# Patient Record
Sex: FEMALE | Race: WHITE | Employment: OTHER | URBAN - METROPOLITAN AREA
[De-identification: names, ages, dates, MRNs, and addresses within clinical notes are randomized per-mention and may not be internally consistent; named-entity substitution may affect disease eponyms.]

---

## 2017-05-01 ENCOUNTER — LAB ENCOUNTER (OUTPATIENT)
Dept: LAB | Facility: HOSPITAL | Age: 82
End: 2017-05-01
Attending: OBSTETRICS & GYNECOLOGY
Payer: MEDICARE

## 2017-05-01 DIAGNOSIS — Z51.81 ENCOUNTER FOR THERAPEUTIC DRUG MONITORING: Primary | ICD-10-CM

## 2017-05-01 PROCEDURE — 85610 PROTHROMBIN TIME: CPT

## 2017-05-01 PROCEDURE — 36415 COLL VENOUS BLD VENIPUNCTURE: CPT

## 2017-05-02 ENCOUNTER — LAB ENCOUNTER (OUTPATIENT)
Dept: LAB | Facility: HOSPITAL | Age: 82
End: 2017-05-02
Attending: INTERNAL MEDICINE
Payer: MEDICARE

## 2017-05-02 DIAGNOSIS — Z51.81 ENCOUNTER FOR THERAPEUTIC DRUG MONITORING: Primary | ICD-10-CM

## 2017-05-02 DIAGNOSIS — Z79.01 LONG TERM (CURRENT) USE OF ANTICOAGULANTS: ICD-10-CM

## 2017-05-02 PROCEDURE — 36415 COLL VENOUS BLD VENIPUNCTURE: CPT

## 2017-05-02 PROCEDURE — 85610 PROTHROMBIN TIME: CPT

## 2017-07-07 ENCOUNTER — HOSPITAL ENCOUNTER (OUTPATIENT)
Dept: LAB | Facility: HOSPITAL | Age: 82
Discharge: HOME | End: 2017-07-07
Attending: INTERNAL MEDICINE
Payer: MEDICARE

## 2017-07-07 ENCOUNTER — HOSPITAL ENCOUNTER (OUTPATIENT)
Dept: LAB | Facility: HOSPITAL | Age: 82
Discharge: HOME | End: 2017-07-07
Attending: FAMILY MEDICINE
Payer: MEDICARE

## 2017-07-07 DIAGNOSIS — Z87.59 HISTORY OF MATERNAL PULMONARY EMBOLUS: ICD-10-CM

## 2017-07-07 DIAGNOSIS — Z86.711 HISTORY OF MATERNAL PULMONARY EMBOLUS: ICD-10-CM

## 2017-07-07 LAB
ALBUMIN SERPL-MCNC: 3.5 G/DL (ref 3.5–4.8)
ALP LIVER SERPL-CCNC: 91 U/L (ref 55–142)
ALT SERPL-CCNC: 15 U/L (ref 14–54)
AST SERPL-CCNC: 19 U/L (ref 15–41)
BILIRUB DIRECT SERPL-MCNC: 0.1 MG/DL (ref 0.1–0.5)
BILIRUB SERPL-MCNC: 0.5 MG/DL (ref 0.1–2)
M PROTEIN MFR SERPL ELPH: 7.7 G/DL (ref 6.1–8.3)
POC INR: 3.8 (ref 0.8–1.3)

## 2017-07-07 PROCEDURE — 85610 PROTHROMBIN TIME: CPT

## 2017-07-07 PROCEDURE — 80076 HEPATIC FUNCTION PANEL: CPT

## 2017-07-07 PROCEDURE — 36415 COLL VENOUS BLD VENIPUNCTURE: CPT

## 2017-07-17 ENCOUNTER — HOSPITAL ENCOUNTER (OUTPATIENT)
Dept: LAB | Facility: HOSPITAL | Age: 82
Discharge: HOME | End: 2017-07-17
Attending: FAMILY MEDICINE
Payer: MEDICARE

## 2017-07-17 DIAGNOSIS — Z86.711 HISTORY OF MATERNAL PULMONARY EMBOLUS: ICD-10-CM

## 2017-07-17 DIAGNOSIS — Z87.59 HISTORY OF MATERNAL PULMONARY EMBOLUS: ICD-10-CM

## 2017-07-17 LAB — POC INR: 2.9 (ref 0.8–1.3)

## 2017-07-17 PROCEDURE — 85610 PROTHROMBIN TIME: CPT

## 2017-07-24 ENCOUNTER — LAB SERVICES (OUTPATIENT)
Dept: OTHER | Age: 82
End: 2017-07-24

## 2017-07-24 LAB
INTERNATIONAL NORMALIZED RATIO: 2.1 (ref 1.8–3.5)
PROTHROMBIN TIME, THERAPEUTIC: 22.7 S (ref 9.8–11.8)

## 2017-08-07 ENCOUNTER — HOSPITAL ENCOUNTER (OUTPATIENT)
Dept: LAB | Facility: HOSPITAL | Age: 82
Discharge: HOME | End: 2017-08-07
Attending: FAMILY MEDICINE
Payer: MEDICARE

## 2017-08-07 DIAGNOSIS — Z86.711 HISTORY OF MATERNAL PULMONARY EMBOLUS: ICD-10-CM

## 2017-08-07 DIAGNOSIS — Z87.59 HISTORY OF MATERNAL PULMONARY EMBOLUS: ICD-10-CM

## 2017-08-07 LAB — POC INR: 2.5 (ref 0.8–1.3)

## 2017-08-07 PROCEDURE — 85610 PROTHROMBIN TIME: CPT

## 2017-08-21 ENCOUNTER — HOSPITAL ENCOUNTER (OUTPATIENT)
Dept: LAB | Facility: HOSPITAL | Age: 82
Discharge: HOME | End: 2017-08-21
Attending: FAMILY MEDICINE
Payer: MEDICARE

## 2017-08-21 DIAGNOSIS — Z86.711 HISTORY OF MATERNAL PULMONARY EMBOLUS: ICD-10-CM

## 2017-08-21 DIAGNOSIS — Z87.59 HISTORY OF MATERNAL PULMONARY EMBOLUS: ICD-10-CM

## 2017-08-21 LAB — POC INR: 1.9 (ref 0.8–1.3)

## 2017-08-21 PROCEDURE — 85610 PROTHROMBIN TIME: CPT

## 2017-09-06 ENCOUNTER — HOSPITAL ENCOUNTER (OUTPATIENT)
Dept: LAB | Facility: HOSPITAL | Age: 82
Discharge: HOME | End: 2017-09-06
Attending: FAMILY MEDICINE
Payer: MEDICARE

## 2017-09-06 DIAGNOSIS — Z87.59 HISTORY OF MATERNAL PULMONARY EMBOLUS: ICD-10-CM

## 2017-09-06 DIAGNOSIS — Z86.711 HISTORY OF MATERNAL PULMONARY EMBOLUS: ICD-10-CM

## 2017-09-06 LAB — POC INR: 2.6 (ref 0.8–1.3)

## 2017-09-06 PROCEDURE — 85610 PROTHROMBIN TIME: CPT

## 2017-09-19 ENCOUNTER — HOSPITAL ENCOUNTER (OUTPATIENT)
Dept: LAB | Facility: HOSPITAL | Age: 82
Discharge: HOME | End: 2017-09-19
Attending: FAMILY MEDICINE
Payer: MEDICARE

## 2017-09-19 DIAGNOSIS — Z87.59 HISTORY OF MATERNAL PULMONARY EMBOLUS: ICD-10-CM

## 2017-09-19 DIAGNOSIS — Z86.711 HISTORY OF MATERNAL PULMONARY EMBOLUS: ICD-10-CM

## 2017-09-19 LAB — POC INR: 2 (ref 0.8–1.3)

## 2017-09-19 PROCEDURE — 85610 PROTHROMBIN TIME: CPT

## 2017-10-03 ENCOUNTER — HOSPITAL ENCOUNTER (OUTPATIENT)
Dept: LAB | Facility: HOSPITAL | Age: 82
Discharge: HOME | End: 2017-10-03
Attending: FAMILY MEDICINE
Payer: MEDICARE

## 2017-10-03 DIAGNOSIS — Z87.59 HISTORY OF MATERNAL PULMONARY EMBOLUS: ICD-10-CM

## 2017-10-03 DIAGNOSIS — Z86.711 HISTORY OF MATERNAL PULMONARY EMBOLUS: ICD-10-CM

## 2017-10-03 PROCEDURE — 85610 PROTHROMBIN TIME: CPT

## 2017-10-30 ENCOUNTER — LAB SERVICES (OUTPATIENT)
Dept: OTHER | Age: 82
End: 2017-10-30

## 2017-10-30 LAB
INTERNATIONAL NORMALIZED RATIO: 2.2 (ref 1.8–3.5)
PROTHROMBIN TIME, THERAPEUTIC: 24.2 S (ref 9.8–11.8)

## 2017-11-30 ENCOUNTER — HOSPITAL ENCOUNTER (OUTPATIENT)
Dept: LAB | Facility: HOSPITAL | Age: 82
Discharge: HOME | End: 2017-11-30
Attending: FAMILY MEDICINE
Payer: MEDICARE

## 2017-11-30 DIAGNOSIS — Z87.59 HISTORY OF MATERNAL PULMONARY EMBOLUS: ICD-10-CM

## 2017-11-30 DIAGNOSIS — Z86.711 HISTORY OF MATERNAL PULMONARY EMBOLUS: ICD-10-CM

## 2017-11-30 PROCEDURE — 85610 PROTHROMBIN TIME: CPT

## 2017-12-28 ENCOUNTER — HOSPITAL ENCOUNTER (OUTPATIENT)
Dept: LAB | Facility: HOSPITAL | Age: 82
Discharge: HOME | End: 2017-12-28
Attending: FAMILY MEDICINE
Payer: MEDICARE

## 2017-12-28 DIAGNOSIS — Z87.59 HISTORY OF MATERNAL PULMONARY EMBOLUS: ICD-10-CM

## 2017-12-28 DIAGNOSIS — Z86.711 HISTORY OF MATERNAL PULMONARY EMBOLUS: ICD-10-CM

## 2017-12-28 PROCEDURE — 85610 PROTHROMBIN TIME: CPT

## 2018-01-18 ENCOUNTER — HOSPITAL ENCOUNTER (OUTPATIENT)
Dept: LAB | Facility: HOSPITAL | Age: 83
Discharge: HOME | End: 2018-01-18
Attending: FAMILY MEDICINE
Payer: MEDICARE

## 2018-01-18 DIAGNOSIS — Z87.59 HISTORY OF MATERNAL PULMONARY EMBOLUS: ICD-10-CM

## 2018-01-18 DIAGNOSIS — Z86.711 HISTORY OF MATERNAL PULMONARY EMBOLUS: ICD-10-CM

## 2018-01-18 LAB
ALBUMIN SERPL-MCNC: 3.7 G/DL (ref 3.5–4.8)
ALP LIVER SERPL-CCNC: 101 U/L (ref 55–142)
ALT SERPL-CCNC: 18 U/L (ref 14–54)
AST SERPL-CCNC: 21 U/L (ref 15–41)
BASOPHILS # BLD AUTO: 0.05 X10(3) UL (ref 0–0.1)
BASOPHILS NFR BLD AUTO: 0.7 %
BILIRUB SERPL-MCNC: 0.9 MG/DL (ref 0.1–2)
BILIRUB UR QL STRIP.AUTO: NEGATIVE
BUN BLD-MCNC: 35 MG/DL (ref 8–20)
CALCIUM BLD-MCNC: 9.3 MG/DL (ref 8.3–10.3)
CHLORIDE: 107 MMOL/L (ref 101–111)
CHOLEST SMN-MCNC: 168 MG/DL (ref ?–200)
CLARITY UR REFRACT.AUTO: CLEAR
CO2: 26 MMOL/L (ref 22–32)
COLOR UR AUTO: YELLOW
CREAT BLD-MCNC: 1.18 MG/DL (ref 0.55–1.02)
EOSINOPHIL # BLD AUTO: 0.22 X10(3) UL (ref 0–0.3)
EOSINOPHIL NFR BLD AUTO: 3.2 %
ERYTHROCYTE [DISTWIDTH] IN BLOOD BY AUTOMATED COUNT: 14.1 % (ref 11.5–16)
GLUCOSE BLD-MCNC: 108 MG/DL (ref 70–99)
GLUCOSE UR STRIP.AUTO-MCNC: NEGATIVE MG/DL
HCT VFR BLD AUTO: 44.5 % (ref 34–50)
HDLC SERPL-MCNC: 52 MG/DL (ref 45–?)
HDLC SERPL: 3.23 {RATIO} (ref ?–4.44)
HGB BLD-MCNC: 14.1 G/DL (ref 12–16)
IMMATURE GRANULOCYTE COUNT: 0.03 X10(3) UL (ref 0–1)
IMMATURE GRANULOCYTE RATIO %: 0.4 %
KETONES UR STRIP.AUTO-MCNC: NEGATIVE MG/DL
LDLC SERPL CALC-MCNC: 92 MG/DL (ref ?–130)
LYMPHOCYTES # BLD AUTO: 1.68 X10(3) UL (ref 0.9–4)
LYMPHOCYTES NFR BLD AUTO: 24.6 %
M PROTEIN MFR SERPL ELPH: 8.5 G/DL (ref 6.1–8.3)
MCH RBC QN AUTO: 27.5 PG (ref 27–33.2)
MCHC RBC AUTO-ENTMCNC: 31.7 G/DL (ref 31–37)
MCV RBC AUTO: 86.9 FL (ref 81–100)
MONOCYTES # BLD AUTO: 0.6 X10(3) UL (ref 0.1–0.6)
MONOCYTES NFR BLD AUTO: 8.8 %
NEUTROPHIL ABS PRELIM: 4.26 X10 (3) UL (ref 1.3–6.7)
NEUTROPHILS # BLD AUTO: 4.26 X10(3) UL (ref 1.3–6.7)
NEUTROPHILS NFR BLD AUTO: 62.3 %
NITRITE UR QL STRIP.AUTO: NEGATIVE
NONHDLC SERPL-MCNC: 116 MG/DL (ref ?–130)
PH UR STRIP.AUTO: 6 [PH] (ref 4.5–8)
PLATELET # BLD AUTO: 229 10(3)UL (ref 150–450)
POC INR: 2.1 (ref 0.8–1.3)
POTASSIUM SERPL-SCNC: 4.7 MMOL/L (ref 3.6–5.1)
PROT UR STRIP.AUTO-MCNC: NEGATIVE MG/DL
RBC # BLD AUTO: 5.12 X10(6)UL (ref 3.8–5.1)
RBC UR QL AUTO: NEGATIVE
RED CELL DISTRIBUTION WIDTH-SD: 44.9 FL (ref 35.1–46.3)
SODIUM SERPL-SCNC: 141 MMOL/L (ref 136–144)
SP GR UR STRIP.AUTO: 1.02 (ref 1–1.03)
TRIGL SERPL-MCNC: 121 MG/DL (ref ?–150)
TSI SER-ACNC: 1.58 MIU/ML (ref 0.35–5.5)
UROBILINOGEN UR STRIP.AUTO-MCNC: 0.2 MG/DL
VLDLC SERPL CALC-MCNC: 24 MG/DL (ref 5–40)
WBC # BLD AUTO: 6.8 X10(3) UL (ref 4–13)

## 2018-01-18 PROCEDURE — 36415 COLL VENOUS BLD VENIPUNCTURE: CPT | Performed by: FAMILY MEDICINE

## 2018-01-18 PROCEDURE — 80053 COMPREHEN METABOLIC PANEL: CPT | Performed by: FAMILY MEDICINE

## 2018-01-18 PROCEDURE — 85610 PROTHROMBIN TIME: CPT

## 2018-01-18 PROCEDURE — 81001 URINALYSIS AUTO W/SCOPE: CPT | Performed by: FAMILY MEDICINE

## 2018-01-18 PROCEDURE — 84443 ASSAY THYROID STIM HORMONE: CPT | Performed by: FAMILY MEDICINE

## 2018-01-18 PROCEDURE — 85025 COMPLETE CBC W/AUTO DIFF WBC: CPT | Performed by: FAMILY MEDICINE

## 2018-01-18 PROCEDURE — 80061 LIPID PANEL: CPT | Performed by: FAMILY MEDICINE

## 2018-02-20 ENCOUNTER — NURSE ONLY (OUTPATIENT)
Dept: LAB | Age: 83
End: 2018-02-20
Attending: FAMILY MEDICINE
Payer: MEDICARE

## 2018-02-20 DIAGNOSIS — E03.9 HYPOTHYROIDISM: Primary | ICD-10-CM

## 2018-02-20 LAB
INR BLD: 1.98 (ref 0.89–1.11)
PSA SERPL DL<=0.01 NG/ML-MCNC: 22.5 SECONDS (ref 12–14.3)

## 2018-02-20 PROCEDURE — 85610 PROTHROMBIN TIME: CPT

## 2018-02-20 PROCEDURE — 36415 COLL VENOUS BLD VENIPUNCTURE: CPT

## 2018-02-27 ENCOUNTER — NURSE ONLY (OUTPATIENT)
Dept: LAB | Age: 83
End: 2018-02-27
Attending: FAMILY MEDICINE
Payer: MEDICARE

## 2018-02-27 DIAGNOSIS — D68.62 LUPUS ANTICOAGULANT SYNDROME (HCC): Primary | ICD-10-CM

## 2018-02-27 LAB
INR BLD: 1.43 (ref 0.89–1.11)
PSA SERPL DL<=0.01 NG/ML-MCNC: 17.5 SECONDS (ref 12–14.3)

## 2018-02-27 PROCEDURE — 85610 PROTHROMBIN TIME: CPT

## 2018-02-27 PROCEDURE — 36415 COLL VENOUS BLD VENIPUNCTURE: CPT

## 2018-03-06 ENCOUNTER — APPOINTMENT (OUTPATIENT)
Dept: LAB | Age: 83
End: 2018-03-06
Attending: FAMILY MEDICINE
Payer: MEDICARE

## 2018-03-06 DIAGNOSIS — D68.62 LUPUS ANTICOAGULANT SYNDROME (HCC): ICD-10-CM

## 2018-03-06 LAB
INR BLD: 1.9 (ref 0.89–1.11)
PSA SERPL DL<=0.01 NG/ML-MCNC: 21.8 SECONDS (ref 12–14.3)

## 2018-03-06 PROCEDURE — 85610 PROTHROMBIN TIME: CPT

## 2018-03-06 PROCEDURE — 36415 COLL VENOUS BLD VENIPUNCTURE: CPT

## 2018-03-13 ENCOUNTER — APPOINTMENT (OUTPATIENT)
Dept: LAB | Age: 83
End: 2018-03-13
Attending: FAMILY MEDICINE
Payer: MEDICARE

## 2018-03-13 DIAGNOSIS — D68.62 LUPUS ANTICOAGULANT DISORDER (HCC): ICD-10-CM

## 2018-03-13 LAB
INR BLD: 1.75 (ref 0.89–1.11)
PSA SERPL DL<=0.01 NG/ML-MCNC: 20.7 SECONDS (ref 12–14.3)

## 2018-03-13 PROCEDURE — 36415 COLL VENOUS BLD VENIPUNCTURE: CPT

## 2018-03-13 PROCEDURE — 85610 PROTHROMBIN TIME: CPT

## 2018-03-20 ENCOUNTER — APPOINTMENT (OUTPATIENT)
Dept: LAB | Age: 83
End: 2018-03-20
Attending: FAMILY MEDICINE
Payer: MEDICARE

## 2018-03-20 DIAGNOSIS — Z86.711 PERSONAL HISTORY OF PE (PULMONARY EMBOLISM): ICD-10-CM

## 2018-03-20 LAB
INR BLD: 1.98 (ref 0.9–1.1)
PSA SERPL DL<=0.01 NG/ML-MCNC: 23.2 SECONDS (ref 12.4–14.7)

## 2018-03-20 PROCEDURE — 36415 COLL VENOUS BLD VENIPUNCTURE: CPT

## 2018-03-20 PROCEDURE — 85610 PROTHROMBIN TIME: CPT

## 2018-03-27 ENCOUNTER — APPOINTMENT (OUTPATIENT)
Dept: LAB | Age: 83
End: 2018-03-27
Attending: FAMILY MEDICINE
Payer: MEDICARE

## 2018-03-27 DIAGNOSIS — Z86.711 HX OF PULMONARY EMBOLUS: ICD-10-CM

## 2018-03-27 LAB
INR BLD: 2.05 (ref 0.9–1.1)
PSA SERPL DL<=0.01 NG/ML-MCNC: 23.8 SECONDS (ref 12.4–14.7)

## 2018-03-27 PROCEDURE — 85610 PROTHROMBIN TIME: CPT

## 2018-03-27 PROCEDURE — 36415 COLL VENOUS BLD VENIPUNCTURE: CPT

## 2018-04-10 ENCOUNTER — APPOINTMENT (OUTPATIENT)
Dept: LAB | Age: 83
End: 2018-04-10
Attending: FAMILY MEDICINE
Payer: MEDICARE

## 2018-04-10 DIAGNOSIS — Z86.711 HX PULMONARY EMBOLISM: ICD-10-CM

## 2018-04-10 PROCEDURE — 85610 PROTHROMBIN TIME: CPT

## 2018-04-10 PROCEDURE — 36415 COLL VENOUS BLD VENIPUNCTURE: CPT

## 2018-04-24 ENCOUNTER — APPOINTMENT (OUTPATIENT)
Dept: LAB | Age: 83
End: 2018-04-24
Attending: FAMILY MEDICINE
Payer: MEDICARE

## 2018-04-24 ENCOUNTER — LAB ENCOUNTER (OUTPATIENT)
Dept: LAB | Facility: HOSPITAL | Age: 83
End: 2018-04-24
Attending: SURGERY
Payer: MEDICARE

## 2018-04-24 DIAGNOSIS — D34 THYROID ADENOMA: Primary | ICD-10-CM

## 2018-04-24 DIAGNOSIS — I26.99 PE (PULMONARY THROMBOEMBOLISM) (HCC): ICD-10-CM

## 2018-04-24 PROCEDURE — 84481 FREE ASSAY (FT-3): CPT

## 2018-04-24 PROCEDURE — 84443 ASSAY THYROID STIM HORMONE: CPT

## 2018-04-24 PROCEDURE — 85610 PROTHROMBIN TIME: CPT

## 2018-04-24 PROCEDURE — 36415 COLL VENOUS BLD VENIPUNCTURE: CPT

## 2018-04-24 PROCEDURE — 84439 ASSAY OF FREE THYROXINE: CPT

## 2018-05-01 ENCOUNTER — APPOINTMENT (OUTPATIENT)
Dept: LAB | Age: 83
End: 2018-05-01
Attending: FAMILY MEDICINE
Payer: MEDICARE

## 2018-05-01 DIAGNOSIS — I26.99 PE (PULMONARY THROMBOEMBOLISM) (HCC): ICD-10-CM

## 2018-05-01 PROCEDURE — 36415 COLL VENOUS BLD VENIPUNCTURE: CPT

## 2018-05-01 PROCEDURE — 85610 PROTHROMBIN TIME: CPT

## 2018-05-08 ENCOUNTER — APPOINTMENT (OUTPATIENT)
Dept: LAB | Age: 83
End: 2018-05-08
Attending: FAMILY MEDICINE
Payer: MEDICARE

## 2018-05-08 DIAGNOSIS — I26.99 PE (PULMONARY THROMBOEMBOLISM) (HCC): ICD-10-CM

## 2018-05-08 PROCEDURE — 85610 PROTHROMBIN TIME: CPT

## 2018-05-08 PROCEDURE — 36415 COLL VENOUS BLD VENIPUNCTURE: CPT

## 2018-05-15 ENCOUNTER — APPOINTMENT (OUTPATIENT)
Dept: LAB | Age: 83
End: 2018-05-15
Attending: FAMILY MEDICINE
Payer: MEDICARE

## 2018-05-15 DIAGNOSIS — Z86.711 HISTORY OF PULMONARY EMBOLISM: ICD-10-CM

## 2018-05-15 PROCEDURE — 36415 COLL VENOUS BLD VENIPUNCTURE: CPT

## 2018-05-15 PROCEDURE — 85610 PROTHROMBIN TIME: CPT

## 2018-05-29 ENCOUNTER — APPOINTMENT (OUTPATIENT)
Dept: LAB | Age: 83
End: 2018-05-29
Attending: FAMILY MEDICINE
Payer: MEDICARE

## 2018-05-29 DIAGNOSIS — Z86.711 HISTORY OF PULMONARY EMBOLISM: ICD-10-CM

## 2018-05-29 PROCEDURE — 85610 PROTHROMBIN TIME: CPT

## 2018-05-29 PROCEDURE — 36415 COLL VENOUS BLD VENIPUNCTURE: CPT

## 2018-06-12 ENCOUNTER — APPOINTMENT (OUTPATIENT)
Dept: LAB | Age: 83
End: 2018-06-12
Attending: FAMILY MEDICINE
Payer: MEDICARE

## 2018-06-12 DIAGNOSIS — Z86.711 HISTORY OF PULMONARY EMBOLISM: ICD-10-CM

## 2018-06-12 PROCEDURE — 85610 PROTHROMBIN TIME: CPT

## 2018-06-12 PROCEDURE — 36415 COLL VENOUS BLD VENIPUNCTURE: CPT

## 2018-06-26 ENCOUNTER — APPOINTMENT (OUTPATIENT)
Dept: LAB | Age: 83
End: 2018-06-26
Attending: FAMILY MEDICINE
Payer: MEDICARE

## 2018-06-26 DIAGNOSIS — Z86.711 HISTORY OF PULMONARY EMBOLISM: ICD-10-CM

## 2018-06-26 PROCEDURE — 36415 COLL VENOUS BLD VENIPUNCTURE: CPT

## 2018-06-26 PROCEDURE — 85610 PROTHROMBIN TIME: CPT

## 2018-07-10 ENCOUNTER — APPOINTMENT (OUTPATIENT)
Dept: LAB | Age: 83
End: 2018-07-10
Attending: FAMILY MEDICINE
Payer: MEDICARE

## 2018-07-10 DIAGNOSIS — Z86.711 HISTORY OF PULMONARY EMBOLISM: ICD-10-CM

## 2018-07-10 LAB
INR BLD: 2.65 (ref 0.9–1.1)
PSA SERPL DL<=0.01 NG/ML-MCNC: 29.1 SECONDS (ref 12.4–14.7)

## 2018-07-10 PROCEDURE — 85610 PROTHROMBIN TIME: CPT

## 2018-07-10 PROCEDURE — 36415 COLL VENOUS BLD VENIPUNCTURE: CPT

## 2018-08-07 ENCOUNTER — APPOINTMENT (OUTPATIENT)
Dept: LAB | Age: 83
End: 2018-08-07
Attending: FAMILY MEDICINE
Payer: MEDICARE

## 2018-08-07 DIAGNOSIS — Z86.711 HISTORY OF PULMONARY EMBOLISM: ICD-10-CM

## 2018-08-07 LAB
INR BLD: 3.18 (ref 0.9–1.1)
PSA SERPL DL<=0.01 NG/ML-MCNC: 33.6 SECONDS (ref 12.4–14.7)

## 2018-08-07 PROCEDURE — 36415 COLL VENOUS BLD VENIPUNCTURE: CPT

## 2018-08-07 PROCEDURE — 85610 PROTHROMBIN TIME: CPT

## 2018-08-14 ENCOUNTER — APPOINTMENT (OUTPATIENT)
Dept: LAB | Age: 83
End: 2018-08-14
Attending: FAMILY MEDICINE
Payer: MEDICARE

## 2018-08-14 DIAGNOSIS — Z86.711 HISTORY OF PULMONARY EMBOLUS (PE): ICD-10-CM

## 2018-08-14 LAB
INR BLD: 2.62 (ref 0.9–1.1)
PSA SERPL DL<=0.01 NG/ML-MCNC: 27.6 SECONDS (ref 12–14.1)

## 2018-08-14 PROCEDURE — 36415 COLL VENOUS BLD VENIPUNCTURE: CPT

## 2018-08-14 PROCEDURE — 85610 PROTHROMBIN TIME: CPT

## 2018-09-04 ENCOUNTER — APPOINTMENT (OUTPATIENT)
Dept: LAB | Age: 83
End: 2018-09-04
Attending: FAMILY MEDICINE
Payer: MEDICARE

## 2018-09-04 DIAGNOSIS — Z86.711 HX PULMONARY EMBOLISM: ICD-10-CM

## 2018-09-04 LAB
INR BLD: 3.21 (ref 0.9–1.1)
PSA SERPL DL<=0.01 NG/ML-MCNC: 33.8 SECONDS (ref 12.4–14.7)

## 2018-09-04 PROCEDURE — 36415 COLL VENOUS BLD VENIPUNCTURE: CPT

## 2018-09-04 PROCEDURE — 85610 PROTHROMBIN TIME: CPT

## 2018-09-11 ENCOUNTER — APPOINTMENT (OUTPATIENT)
Dept: LAB | Age: 83
End: 2018-09-11
Attending: FAMILY MEDICINE
Payer: MEDICARE

## 2018-09-11 DIAGNOSIS — Z86.711 HISTORY OF PULMONARY EMBOLISM: ICD-10-CM

## 2018-09-11 LAB
INR BLD: 3.74 (ref 0.9–1.1)
PSA SERPL DL<=0.01 NG/ML-MCNC: 38.1 SECONDS (ref 12.4–14.7)

## 2018-09-11 PROCEDURE — 36415 COLL VENOUS BLD VENIPUNCTURE: CPT

## 2018-09-11 PROCEDURE — 85610 PROTHROMBIN TIME: CPT

## 2018-09-13 ENCOUNTER — RT VISIT (OUTPATIENT)
Dept: RESPIRATORY THERAPY | Facility: HOSPITAL | Age: 83
End: 2018-09-13
Attending: INTERNAL MEDICINE
Payer: MEDICARE

## 2018-09-13 DIAGNOSIS — R05.9 COUGH: ICD-10-CM

## 2018-09-14 NOTE — PROCEDURES
Orion Floers is a 79-year-old  female who stands 5 feet 10 inches tall and weighs 175 pounds. She underwent standard pulmonary function testing on 9/13/18. She carries a diagnosis of cough. No smoking history is recorded.   Results are as follo

## 2018-09-18 ENCOUNTER — APPOINTMENT (OUTPATIENT)
Dept: LAB | Age: 83
End: 2018-09-18
Attending: FAMILY MEDICINE
Payer: MEDICARE

## 2018-09-18 DIAGNOSIS — Z86.711 HISTORY OF PULMONARY EMBOLUS (PE): ICD-10-CM

## 2018-09-18 LAB
INR BLD: 2.34 (ref 0.9–1.1)
PSA SERPL DL<=0.01 NG/ML-MCNC: 26.4 SECONDS (ref 12.4–14.7)

## 2018-09-18 PROCEDURE — 85610 PROTHROMBIN TIME: CPT

## 2018-09-18 PROCEDURE — 36415 COLL VENOUS BLD VENIPUNCTURE: CPT

## 2018-09-25 ENCOUNTER — APPOINTMENT (OUTPATIENT)
Dept: LAB | Age: 83
End: 2018-09-25
Attending: FAMILY MEDICINE
Payer: MEDICARE

## 2018-09-25 DIAGNOSIS — Z86.711 HISTORY OF PULMONARY EMBOLISM: ICD-10-CM

## 2018-09-25 LAB
INR BLD: 2.33 (ref 0.9–1.1)
PSA SERPL DL<=0.01 NG/ML-MCNC: 26.3 SECONDS (ref 12.4–14.7)

## 2018-09-25 PROCEDURE — 36415 COLL VENOUS BLD VENIPUNCTURE: CPT

## 2018-09-25 PROCEDURE — 85610 PROTHROMBIN TIME: CPT

## 2018-10-09 ENCOUNTER — APPOINTMENT (OUTPATIENT)
Dept: LAB | Age: 83
End: 2018-10-09
Attending: FAMILY MEDICINE
Payer: MEDICARE

## 2018-10-09 DIAGNOSIS — Z86.711 HISTORY OF PULMONARY EMBOLISM: ICD-10-CM

## 2018-10-09 PROCEDURE — 85610 PROTHROMBIN TIME: CPT

## 2018-10-09 PROCEDURE — 36415 COLL VENOUS BLD VENIPUNCTURE: CPT

## 2018-10-15 ENCOUNTER — HOSPITAL ENCOUNTER (EMERGENCY)
Facility: HOSPITAL | Age: 83
Discharge: HOME | End: 2018-10-15
Attending: EMERGENCY MEDICINE
Payer: MEDICARE

## 2018-10-15 ENCOUNTER — APPOINTMENT (OUTPATIENT)
Dept: CT IMAGING | Facility: HOSPITAL | Age: 83
End: 2018-10-15
Attending: EMERGENCY MEDICINE
Payer: MEDICARE

## 2018-10-15 VITALS
RESPIRATION RATE: 18 BRPM | BODY MASS INDEX: 27.3 KG/M2 | WEIGHT: 173.94 LBS | DIASTOLIC BLOOD PRESSURE: 82 MMHG | TEMPERATURE: 98 F | OXYGEN SATURATION: 94 % | HEART RATE: 80 BPM | SYSTOLIC BLOOD PRESSURE: 166 MMHG | HEIGHT: 67 IN

## 2018-10-15 DIAGNOSIS — M54.2 NECK PAIN: Primary | ICD-10-CM

## 2018-10-15 PROCEDURE — 80053 COMPREHEN METABOLIC PANEL: CPT | Performed by: EMERGENCY MEDICINE

## 2018-10-15 PROCEDURE — 84132 ASSAY OF SERUM POTASSIUM: CPT | Performed by: EMERGENCY MEDICINE

## 2018-10-15 PROCEDURE — 36415 COLL VENOUS BLD VENIPUNCTURE: CPT

## 2018-10-15 PROCEDURE — 85025 COMPLETE CBC W/AUTO DIFF WBC: CPT | Performed by: EMERGENCY MEDICINE

## 2018-10-15 PROCEDURE — 99284 EMERGENCY DEPT VISIT MOD MDM: CPT

## 2018-10-15 PROCEDURE — 70498 CT ANGIOGRAPHY NECK: CPT | Performed by: EMERGENCY MEDICINE

## 2018-10-15 PROCEDURE — 84450 TRANSFERASE (AST) (SGOT): CPT | Performed by: EMERGENCY MEDICINE

## 2018-10-15 PROCEDURE — 85610 PROTHROMBIN TIME: CPT | Performed by: EMERGENCY MEDICINE

## 2018-10-15 PROCEDURE — 85730 THROMBOPLASTIN TIME PARTIAL: CPT | Performed by: EMERGENCY MEDICINE

## 2018-10-15 PROCEDURE — 70496 CT ANGIOGRAPHY HEAD: CPT | Performed by: EMERGENCY MEDICINE

## 2018-10-15 RX ORDER — ACETAMINOPHEN 500 MG
TABLET ORAL
Status: DISCONTINUED
Start: 2018-10-15 | End: 2018-10-15

## 2018-10-15 RX ORDER — ACETAMINOPHEN 500 MG
500 TABLET ORAL ONCE
Status: COMPLETED | OUTPATIENT
Start: 2018-10-15 | End: 2018-10-15

## 2018-10-15 RX ORDER — ACETAMINOPHEN 500 MG
1000 TABLET ORAL ONCE
Status: COMPLETED | OUTPATIENT
Start: 2018-10-15 | End: 2018-10-15

## 2018-10-15 NOTE — ED NOTES
Pt states shes amb only with a walker ,medi car =called, pt advised she will be charged $40 and $2/mile, pt is fine with that

## 2018-10-15 NOTE — ED PROVIDER NOTES
Patient Seen in: [facility] Emergency Department    History   Patient presents with:  Headache (neurologic)    Stated Complaint: h/a x 1 week    HPI    Patient is a 70-year-old female who presents emergency room with a history of left-sided lateral well-nourished female sitting up breathing easily in no apparent distress. Patient is nontoxic in appearance. HEENT: Head is normocephalic, atraumatic. Pupils are 4 mm equally round and reactive to light. Oropharynx is clear.  Mucous membranes are moist. (*)     All other components within normal limits   PTT, ACTIVATED - Abnormal; Notable for the following components:    PTT 44.4 (*)     All other components within normal limits   CBC W/ DIFFERENTIAL - Abnormal; Notable for the following components:    RD lateral neck with radiation towards the head and history of Coumadin use. There is evidence of atrophy and microvascular ischemic change but no evidence of any carotid or vertebral artery stenosis or dissection.   There is no large vessel occlusion appreci

## 2018-10-23 ENCOUNTER — APPOINTMENT (OUTPATIENT)
Dept: LAB | Age: 83
End: 2018-10-23
Attending: FAMILY MEDICINE
Payer: MEDICARE

## 2018-10-23 DIAGNOSIS — Z86.711 HISTORY OF PULMONARY EMBOLISM: ICD-10-CM

## 2018-10-23 PROCEDURE — 85610 PROTHROMBIN TIME: CPT

## 2018-10-23 PROCEDURE — 36415 COLL VENOUS BLD VENIPUNCTURE: CPT

## 2018-11-06 ENCOUNTER — APPOINTMENT (OUTPATIENT)
Dept: LAB | Age: 83
End: 2018-11-06
Attending: FAMILY MEDICINE
Payer: MEDICARE

## 2018-11-06 DIAGNOSIS — Z86.711 HISTORY OF PULMONARY EMBOLUS (PE): ICD-10-CM

## 2018-11-06 PROCEDURE — 85610 PROTHROMBIN TIME: CPT

## 2018-11-06 PROCEDURE — 36415 COLL VENOUS BLD VENIPUNCTURE: CPT

## 2018-11-20 ENCOUNTER — APPOINTMENT (OUTPATIENT)
Dept: LAB | Age: 83
End: 2018-11-20
Attending: FAMILY MEDICINE
Payer: MEDICARE

## 2018-11-20 ENCOUNTER — APPOINTMENT (OUTPATIENT)
Dept: SPEECH THERAPY | Facility: HOSPITAL | Age: 83
End: 2018-11-20
Attending: INTERNAL MEDICINE
Payer: MEDICARE

## 2018-11-20 DIAGNOSIS — Z86.711 HISTORY OF PULMONARY EMBOLISM: ICD-10-CM

## 2018-11-20 PROCEDURE — 85610 PROTHROMBIN TIME: CPT

## 2018-11-20 PROCEDURE — 36415 COLL VENOUS BLD VENIPUNCTURE: CPT

## 2018-11-27 ENCOUNTER — APPOINTMENT (OUTPATIENT)
Dept: LAB | Age: 83
End: 2018-11-27
Attending: FAMILY MEDICINE
Payer: MEDICARE

## 2018-11-27 DIAGNOSIS — Z86.711 HISTORY OF PULMONARY EMBOLISM: ICD-10-CM

## 2018-11-27 PROCEDURE — 36415 COLL VENOUS BLD VENIPUNCTURE: CPT

## 2018-11-27 PROCEDURE — 85610 PROTHROMBIN TIME: CPT

## 2018-11-29 ENCOUNTER — HOSPITAL ENCOUNTER (OUTPATIENT)
Dept: SPEECH THERAPY | Facility: HOSPITAL | Age: 83
Setting detail: THERAPIES SERIES
Discharge: HOME | End: 2018-11-29
Attending: INTERNAL MEDICINE
Payer: MEDICARE

## 2018-11-29 DIAGNOSIS — R05.9 COUGH: ICD-10-CM

## 2018-11-29 PROCEDURE — 92610 EVALUATE SWALLOWING FUNCTION: CPT

## 2018-11-30 NOTE — PROGRESS NOTES
ADULT SWALLOWING EVALUATION  Referring Physician: Dr. Jennifer Gillis  Diagnosis: Cough (R05) Date of Service: 11/29/2018   Clinical swallow evaluation completed per MD order.   Patient arrived to session on time with daughter-Renetta who was present during evaluation Rfl: 3   gemfibrozil 600 MG Oral Tab Take 600 mg by mouth daily. Disp:  Rfl:    Calcium Ascorbate 500 MG Oral Tab Take 500 mg by mouth daily. Disp:  Rfl:    Multiple Vitamins-Minerals (OCUVITE ADULT 50+ OR) Take 1 tablet by mouth daily.  Disp:  Rfl:    ome function. Patient was educated on HEP for aspiration precautions, having been provided both verbal and written instruction. Ashtyn River verbalized understanding and stated she will implement HEP in home envrionment.       Recommendations:   1.  VFSS recommend Treatment:   Charges: Eval x1, 05290 Treatment x1, 98140  Total Timed Treatment: 60 min   PLAN  Goals:    Long Term:  LTG 1: Patient will independently tolerate safest/least restrictive means of nutrition/hydration without s/s of aspiration (6 months).     treatment and while under my care.     X___________________________________________________ Date____________________    Certification From: 20/62/8790  To:2/27/2019

## 2018-12-04 ENCOUNTER — APPOINTMENT (OUTPATIENT)
Dept: LAB | Age: 83
End: 2018-12-04
Attending: FAMILY MEDICINE
Payer: MEDICARE

## 2018-12-04 DIAGNOSIS — Z86.711 HISTORY OF PULMONARY EMBOLISM: ICD-10-CM

## 2018-12-04 PROCEDURE — 85610 PROTHROMBIN TIME: CPT

## 2018-12-04 PROCEDURE — 36415 COLL VENOUS BLD VENIPUNCTURE: CPT

## 2018-12-06 ENCOUNTER — APPOINTMENT (OUTPATIENT)
Dept: SPEECH THERAPY | Facility: HOSPITAL | Age: 83
End: 2018-12-06
Attending: INTERNAL MEDICINE
Payer: MEDICARE

## 2018-12-11 ENCOUNTER — APPOINTMENT (OUTPATIENT)
Dept: LAB | Age: 83
End: 2018-12-11
Attending: FAMILY MEDICINE
Payer: MEDICARE

## 2018-12-11 DIAGNOSIS — Z86.711 HISTORY OF PULMONARY EMBOLISM: ICD-10-CM

## 2018-12-11 PROCEDURE — 85610 PROTHROMBIN TIME: CPT

## 2018-12-11 PROCEDURE — 36415 COLL VENOUS BLD VENIPUNCTURE: CPT

## 2018-12-13 ENCOUNTER — APPOINTMENT (OUTPATIENT)
Dept: SPEECH THERAPY | Facility: HOSPITAL | Age: 83
End: 2018-12-13
Attending: INTERNAL MEDICINE
Payer: MEDICARE

## 2018-12-18 ENCOUNTER — HOSPITAL ENCOUNTER (OUTPATIENT)
Dept: GENERAL RADIOLOGY | Facility: HOSPITAL | Age: 83
Discharge: HOME | End: 2018-12-18
Attending: INTERNAL MEDICINE
Payer: MEDICARE

## 2018-12-18 DIAGNOSIS — R13.12 DYSPHAGIA, OROPHARYNGEAL PHASE: ICD-10-CM

## 2018-12-18 PROCEDURE — 92611 MOTION FLUOROSCOPY/SWALLOW: CPT

## 2018-12-18 PROCEDURE — 74230 X-RAY XM SWLNG FUNCJ C+: CPT | Performed by: INTERNAL MEDICINE

## 2018-12-18 NOTE — PROGRESS NOTES
ADULT VIDEOFLUOROSCOPIC SWALLOWING STUDY    Admission Date: 12/18/2018  Evaluation Date: 12/18/18  Radiologist: Dr. Erica Ferguson: Regular  Diet Recommendations - Liquid:  Thin    Further Follow-up:  Compensator LIQUIDS  Method of Presentation: Cup  Oral Phase of Swallow (VFSS - Nectar Thick Liquids):  Within Functional Limits  Triggered at: Valleculae  Residue Severity, Location: Mild;Valleculae  Cleared/Reduced with: Secondary swallow  Laryngeal Penetration: None strengthening, laryngeal and pharyngeal musculature strengthening and facilitate safe po intake of least restrictive diet. EDUCATION/INSTRUCTION  Reviewed results and recommendations with patient/family/caregiver.   Agreement/Understanding verbalized and

## 2018-12-20 ENCOUNTER — APPOINTMENT (OUTPATIENT)
Dept: SPEECH THERAPY | Facility: HOSPITAL | Age: 83
End: 2018-12-20
Attending: INTERNAL MEDICINE
Payer: MEDICARE

## 2018-12-27 ENCOUNTER — APPOINTMENT (OUTPATIENT)
Dept: SPEECH THERAPY | Facility: HOSPITAL | Age: 83
End: 2018-12-27
Attending: INTERNAL MEDICINE
Payer: MEDICARE

## 2018-12-27 ENCOUNTER — NURSE ONLY (OUTPATIENT)
Dept: LAB | Age: 83
End: 2018-12-27
Attending: FAMILY MEDICINE
Payer: MEDICARE

## 2018-12-27 DIAGNOSIS — Z00.00 PHYSICAL EXAM: ICD-10-CM

## 2018-12-27 PROCEDURE — 81001 URINALYSIS AUTO W/SCOPE: CPT

## 2018-12-27 PROCEDURE — 80053 COMPREHEN METABOLIC PANEL: CPT

## 2018-12-27 PROCEDURE — 85610 PROTHROMBIN TIME: CPT

## 2018-12-27 PROCEDURE — 85025 COMPLETE CBC W/AUTO DIFF WBC: CPT

## 2018-12-27 PROCEDURE — 80061 LIPID PANEL: CPT

## 2018-12-27 PROCEDURE — 84443 ASSAY THYROID STIM HORMONE: CPT

## 2018-12-27 PROCEDURE — 84439 ASSAY OF FREE THYROXINE: CPT

## 2018-12-27 PROCEDURE — 36415 COLL VENOUS BLD VENIPUNCTURE: CPT

## 2019-01-03 ENCOUNTER — APPOINTMENT (OUTPATIENT)
Dept: LAB | Age: 84
End: 2019-01-03
Attending: FAMILY MEDICINE
Payer: MEDICARE

## 2019-01-03 DIAGNOSIS — Z86.711 HISTORY OF PULMONARY EMBOLISM: ICD-10-CM

## 2019-01-03 LAB
INR BLD: 2.67 (ref 0.9–1.1)
PSA SERPL DL<=0.01 NG/ML-MCNC: 29.3 SECONDS (ref 12.4–14.7)

## 2019-01-03 PROCEDURE — 36415 COLL VENOUS BLD VENIPUNCTURE: CPT

## 2019-01-03 PROCEDURE — 85610 PROTHROMBIN TIME: CPT

## 2019-01-22 ENCOUNTER — HOSPITAL ENCOUNTER (OUTPATIENT)
Dept: CT IMAGING | Facility: HOSPITAL | Age: 84
Discharge: HOME | End: 2019-01-22
Attending: FAMILY MEDICINE
Payer: MEDICARE

## 2019-01-22 DIAGNOSIS — R22.1 NECK MASS: ICD-10-CM

## 2019-01-22 PROCEDURE — 70490 CT SOFT TISSUE NECK W/O DYE: CPT | Performed by: FAMILY MEDICINE

## 2019-01-29 ENCOUNTER — HOSPITAL ENCOUNTER (OUTPATIENT)
Dept: ULTRASOUND IMAGING | Facility: HOSPITAL | Age: 84
Discharge: HOME | End: 2019-01-29
Attending: FAMILY MEDICINE
Payer: MEDICARE

## 2019-01-29 ENCOUNTER — APPOINTMENT (OUTPATIENT)
Dept: LAB | Age: 84
End: 2019-01-29
Attending: FAMILY MEDICINE
Payer: MEDICARE

## 2019-01-29 DIAGNOSIS — E04.1 THYROID NODULE: ICD-10-CM

## 2019-01-29 DIAGNOSIS — Z86.711 HISTORY OF PULMONARY EMBOLISM: ICD-10-CM

## 2019-01-29 LAB
INR BLD: 2.12 (ref 0.9–1.1)
PSA SERPL DL<=0.01 NG/ML-MCNC: 24.5 SECONDS (ref 12.4–14.7)

## 2019-01-29 PROCEDURE — 85610 PROTHROMBIN TIME: CPT | Performed by: FAMILY MEDICINE

## 2019-01-29 PROCEDURE — 76536 US EXAM OF HEAD AND NECK: CPT | Performed by: FAMILY MEDICINE

## 2019-02-12 ENCOUNTER — APPOINTMENT (OUTPATIENT)
Dept: LAB | Age: 84
End: 2019-02-12
Attending: FAMILY MEDICINE
Payer: MEDICARE

## 2019-02-12 DIAGNOSIS — Z86.711 PERSONAL HISTORY OF PE (PULMONARY EMBOLISM): Primary | ICD-10-CM

## 2019-02-12 DIAGNOSIS — Z86.711 HISTORY OF PULMONARY EMBOLISM: ICD-10-CM

## 2019-02-12 LAB
INR BLD: 1.98 (ref 0.9–1.1)
PSA SERPL DL<=0.01 NG/ML-MCNC: 23.2 SECONDS (ref 12.4–14.7)

## 2019-02-12 PROCEDURE — 85610 PROTHROMBIN TIME: CPT

## 2019-02-12 PROCEDURE — 36415 COLL VENOUS BLD VENIPUNCTURE: CPT

## 2019-02-26 ENCOUNTER — APPOINTMENT (OUTPATIENT)
Dept: LAB | Age: 84
End: 2019-02-26
Attending: FAMILY MEDICINE
Payer: MEDICARE

## 2019-02-26 DIAGNOSIS — Z86.711 HISTORY OF PULMONARY EMBOLISM: ICD-10-CM

## 2019-02-26 LAB
INR BLD: 1.78 (ref 0.9–1.1)
PSA SERPL DL<=0.01 NG/ML-MCNC: 21.3 SECONDS (ref 12.4–14.7)

## 2019-02-26 PROCEDURE — 85610 PROTHROMBIN TIME: CPT

## 2019-02-26 PROCEDURE — 36415 COLL VENOUS BLD VENIPUNCTURE: CPT

## 2019-03-12 ENCOUNTER — APPOINTMENT (OUTPATIENT)
Dept: LAB | Age: 84
End: 2019-03-12
Attending: FAMILY MEDICINE
Payer: MEDICARE

## 2019-03-12 DIAGNOSIS — Z86.711 HISTORY OF PULMONARY EMBOLISM: ICD-10-CM

## 2019-03-12 LAB
INR BLD: 1.84 (ref 0.9–1.1)
PSA SERPL DL<=0.01 NG/ML-MCNC: 22.3 SECONDS (ref 12.5–14.7)

## 2019-03-12 PROCEDURE — 36415 COLL VENOUS BLD VENIPUNCTURE: CPT

## 2019-03-12 PROCEDURE — 85610 PROTHROMBIN TIME: CPT

## 2019-03-19 ENCOUNTER — APPOINTMENT (OUTPATIENT)
Dept: LAB | Age: 84
End: 2019-03-19
Attending: FAMILY MEDICINE
Payer: MEDICARE

## 2019-03-19 DIAGNOSIS — Z86.711 HISTORY OF PULMONARY EMBOLISM: ICD-10-CM

## 2019-03-19 LAB
INR BLD: 2.39 (ref 0.9–1.1)
PSA SERPL DL<=0.01 NG/ML-MCNC: 27.6 SECONDS (ref 12.5–14.7)

## 2019-03-19 PROCEDURE — 85610 PROTHROMBIN TIME: CPT

## 2019-03-19 PROCEDURE — 36415 COLL VENOUS BLD VENIPUNCTURE: CPT

## 2019-04-02 ENCOUNTER — APPOINTMENT (OUTPATIENT)
Dept: LAB | Age: 84
End: 2019-04-02
Attending: FAMILY MEDICINE
Payer: MEDICARE

## 2019-04-02 DIAGNOSIS — Z86.711 HISTORY OF PULMONARY EMBOLISM: ICD-10-CM

## 2019-04-02 PROCEDURE — 36415 COLL VENOUS BLD VENIPUNCTURE: CPT

## 2019-04-02 PROCEDURE — 85610 PROTHROMBIN TIME: CPT

## 2019-04-02 PROCEDURE — 80048 BASIC METABOLIC PNL TOTAL CA: CPT

## 2019-04-11 ENCOUNTER — APPOINTMENT (OUTPATIENT)
Dept: LAB | Age: 84
End: 2019-04-11
Attending: FAMILY MEDICINE
Payer: MEDICARE

## 2019-04-11 DIAGNOSIS — Z86.711 HISTORY OF PULMONARY EMBOLISM: ICD-10-CM

## 2019-04-11 PROCEDURE — 85610 PROTHROMBIN TIME: CPT

## 2019-04-11 PROCEDURE — 36415 COLL VENOUS BLD VENIPUNCTURE: CPT

## 2019-04-12 PROCEDURE — 88173 CYTOPATH EVAL FNA REPORT: CPT | Performed by: INTERNAL MEDICINE

## 2019-04-16 ENCOUNTER — APPOINTMENT (OUTPATIENT)
Dept: LAB | Age: 84
End: 2019-04-16
Attending: FAMILY MEDICINE
Payer: MEDICARE

## 2019-04-16 DIAGNOSIS — Z86.711 HISTORY OF PULMONARY EMBOLISM: ICD-10-CM

## 2019-04-16 PROCEDURE — 85610 PROTHROMBIN TIME: CPT

## 2019-04-16 PROCEDURE — 36415 COLL VENOUS BLD VENIPUNCTURE: CPT

## 2019-04-23 ENCOUNTER — APPOINTMENT (OUTPATIENT)
Dept: LAB | Age: 84
End: 2019-04-23
Attending: FAMILY MEDICINE
Payer: MEDICARE

## 2019-04-23 DIAGNOSIS — I26.99 PULMONARY EMBOLISM WITHOUT ACUTE COR PULMONALE, UNSPECIFIED CHRONICITY, UNSPECIFIED PULMONARY EMBOLISM TYPE (HCC): ICD-10-CM

## 2019-04-23 PROCEDURE — 36415 COLL VENOUS BLD VENIPUNCTURE: CPT

## 2019-04-23 PROCEDURE — 85610 PROTHROMBIN TIME: CPT

## 2019-04-29 PROBLEM — C73 PAPILLARY THYROID CARCINOMA (HCC): Status: ACTIVE | Noted: 2019-04-29

## 2019-05-01 ENCOUNTER — WALK IN (OUTPATIENT)
Dept: URGENT CARE | Age: 84
End: 2019-05-01

## 2019-05-01 DIAGNOSIS — Z11.1 SCREENING-PULMONARY TB: Primary | ICD-10-CM

## 2019-05-01 PROCEDURE — 86580 TB INTRADERMAL TEST: CPT | Performed by: OBSTETRICS & GYNECOLOGY

## 2019-05-03 ENCOUNTER — NURSE ONLY (OUTPATIENT)
Dept: URGENT CARE | Age: 84
End: 2019-05-03

## 2019-05-03 DIAGNOSIS — Z11.1 ENCOUNTER FOR PPD SKIN TEST READING: Primary | ICD-10-CM

## 2019-05-03 LAB
INDURATION: 0 MM (ref 0–?)
SKIN TEST RESULT: NEGATIVE

## 2019-05-03 PROCEDURE — X1094 NO CHARGE VISIT: HCPCS | Performed by: NURSE PRACTITIONER

## 2019-12-05 PROBLEM — E78.2 MIXED HYPERLIPIDEMIA: Status: ACTIVE | Noted: 2019-12-05

## 2019-12-05 PROBLEM — I10 ESSENTIAL HYPERTENSION: Status: ACTIVE | Noted: 2019-12-05

## 2020-01-01 ENCOUNTER — APPOINTMENT (OUTPATIENT)
Dept: CT IMAGING | Facility: HOSPITAL | Age: 85
End: 2020-01-01
Attending: EMERGENCY MEDICINE
Payer: MEDICARE

## 2020-01-01 ENCOUNTER — HOSPITAL ENCOUNTER (OUTPATIENT)
Facility: HOSPITAL | Age: 85
Setting detail: OBSERVATION
Discharge: HOSPICE | End: 2020-01-01
Attending: EMERGENCY MEDICINE | Admitting: HOSPITALIST
Payer: MEDICARE

## 2020-01-01 ENCOUNTER — HOSPITAL ENCOUNTER (EMERGENCY)
Facility: HOSPITAL | Age: 85
Discharge: HOME | End: 2020-01-01
Attending: EMERGENCY MEDICINE
Payer: MEDICARE

## 2020-01-01 ENCOUNTER — HOSPITAL ENCOUNTER (INPATIENT)
Facility: HOSPITAL | Age: 85
LOS: 4 days | DRG: 607 | End: 2020-01-01
Attending: INTERNAL MEDICINE | Admitting: INTERNAL MEDICINE
Payer: OTHER MISCELLANEOUS

## 2020-01-01 VITALS
BODY MASS INDEX: 27 KG/M2 | SYSTOLIC BLOOD PRESSURE: 131 MMHG | HEART RATE: 96 BPM | OXYGEN SATURATION: 93 % | HEIGHT: 67 IN | WEIGHT: 172 LBS | RESPIRATION RATE: 19 BRPM | DIASTOLIC BLOOD PRESSURE: 79 MMHG

## 2020-01-01 VITALS
HEART RATE: 83 BPM | HEIGHT: 67 IN | OXYGEN SATURATION: 91 % | WEIGHT: 156.13 LBS | SYSTOLIC BLOOD PRESSURE: 133 MMHG | TEMPERATURE: 98 F | DIASTOLIC BLOOD PRESSURE: 62 MMHG | BODY MASS INDEX: 24.51 KG/M2 | RESPIRATION RATE: 19 BRPM

## 2020-01-01 VITALS
TEMPERATURE: 102 F | RESPIRATION RATE: 14 BRPM | HEART RATE: 89 BPM | DIASTOLIC BLOOD PRESSURE: 44 MMHG | SYSTOLIC BLOOD PRESSURE: 105 MMHG | OXYGEN SATURATION: 72 %

## 2020-01-01 DIAGNOSIS — R04.0 EPISTAXIS: Primary | ICD-10-CM

## 2020-01-01 DIAGNOSIS — R22.1 NECK MASS: Primary | ICD-10-CM

## 2020-01-01 DIAGNOSIS — D68.9 COAGULOPATHY (HCC): ICD-10-CM

## 2020-01-01 PROCEDURE — 99220 INITIAL OBSERVATION CARE,LEVL III: CPT | Performed by: STUDENT IN AN ORGANIZED HEALTH CARE EDUCATION/TRAINING PROGRAM

## 2020-01-01 PROCEDURE — 30901 CONTROL OF NOSEBLEED: CPT

## 2020-01-01 PROCEDURE — 99233 SBSQ HOSP IP/OBS HIGH 50: CPT | Performed by: HOSPITALIST

## 2020-01-01 PROCEDURE — 99283 EMERGENCY DEPT VISIT LOW MDM: CPT

## 2020-01-01 PROCEDURE — 85610 PROTHROMBIN TIME: CPT | Performed by: EMERGENCY MEDICINE

## 2020-01-01 PROCEDURE — 70491 CT SOFT TISSUE NECK W/DYE: CPT | Performed by: EMERGENCY MEDICINE

## 2020-01-01 PROCEDURE — 99232 SBSQ HOSP IP/OBS MODERATE 35: CPT | Performed by: INTERNAL MEDICINE

## 2020-01-01 PROCEDURE — 85025 COMPLETE CBC W/AUTO DIFF WBC: CPT | Performed by: EMERGENCY MEDICINE

## 2020-01-01 PROCEDURE — 80053 COMPREHEN METABOLIC PANEL: CPT | Performed by: EMERGENCY MEDICINE

## 2020-01-01 PROCEDURE — 36415 COLL VENOUS BLD VENIPUNCTURE: CPT

## 2020-01-01 PROCEDURE — 99225 SUBSEQUENT OBSERVATION CARE: CPT | Performed by: INTERNAL MEDICINE

## 2020-01-01 PROCEDURE — 99217 OBSERVATION CARE DISCHARGE: CPT | Performed by: INTERNAL MEDICINE

## 2020-01-01 RX ORDER — TRANEXAMIC ACID 100 MG/ML
INJECTION, SOLUTION INTRAVENOUS
Status: COMPLETED
Start: 2020-01-01 | End: 2020-01-01

## 2020-01-01 RX ORDER — PANTOPRAZOLE SODIUM 20 MG/1
20 TABLET, DELAYED RELEASE ORAL
Refills: 0 | Status: DISCONTINUED | OUTPATIENT
Start: 2020-01-01 | End: 2020-01-01

## 2020-01-01 RX ORDER — LORAZEPAM 2 MG/ML
0.25 INJECTION INTRAMUSCULAR EVERY 6 HOURS PRN
Status: DISCONTINUED | OUTPATIENT
Start: 2020-01-01 | End: 2020-01-01

## 2020-01-01 RX ORDER — SCOLOPAMINE TRANSDERMAL SYSTEM 1 MG/1
1 PATCH, EXTENDED RELEASE TRANSDERMAL
Status: DISCONTINUED | OUTPATIENT
Start: 2020-01-01 | End: 2020-01-01

## 2020-01-01 RX ORDER — METOPROLOL SUCCINATE 50 MG/1
50 TABLET, EXTENDED RELEASE ORAL
Status: DISCONTINUED | OUTPATIENT
Start: 2020-01-01 | End: 2020-01-01

## 2020-01-01 RX ORDER — ONDANSETRON 2 MG/ML
4 INJECTION INTRAMUSCULAR; INTRAVENOUS ONCE
Status: DISCONTINUED | OUTPATIENT
Start: 2020-01-01 | End: 2020-01-01

## 2020-01-01 RX ORDER — ACETAMINOPHEN 160 MG
2000 TABLET,DISINTEGRATING ORAL DAILY
Status: DISCONTINUED | OUTPATIENT
Start: 2020-01-01 | End: 2020-01-01

## 2020-01-01 RX ORDER — ATORVASTATIN CALCIUM 20 MG/1
20 TABLET, FILM COATED ORAL DAILY
Status: DISCONTINUED | OUTPATIENT
Start: 2020-01-01 | End: 2020-01-01

## 2020-01-01 RX ORDER — ATROPINE SULFATE 10 MG/ML
2 SOLUTION/ DROPS OPHTHALMIC EVERY 2 HOUR PRN
Status: DISCONTINUED | OUTPATIENT
Start: 2020-01-01 | End: 2020-01-01

## 2020-01-01 RX ORDER — ONDANSETRON 2 MG/ML
4 INJECTION INTRAMUSCULAR; INTRAVENOUS EVERY 4 HOURS PRN
Status: DISCONTINUED | OUTPATIENT
Start: 2020-01-01 | End: 2020-01-01

## 2020-01-01 RX ORDER — FUROSEMIDE 10 MG/ML
40 INJECTION INTRAMUSCULAR; INTRAVENOUS EVERY 8 HOURS PRN
Status: DISCONTINUED | OUTPATIENT
Start: 2020-01-01 | End: 2020-01-01

## 2020-01-01 RX ORDER — HYDROMORPHONE HYDROCHLORIDE 1 MG/ML
0.4 INJECTION, SOLUTION INTRAMUSCULAR; INTRAVENOUS; SUBCUTANEOUS EVERY 2 HOUR PRN
Status: DISCONTINUED | OUTPATIENT
Start: 2020-01-01 | End: 2020-01-01

## 2020-01-01 RX ORDER — FUROSEMIDE 40 MG/1
40 TABLET ORAL EVERY 8 HOURS PRN
Status: DISCONTINUED | OUTPATIENT
Start: 2020-01-01 | End: 2020-01-01

## 2020-01-01 RX ORDER — LORAZEPAM 2 MG/ML
0.25 INJECTION INTRAMUSCULAR ONCE
Status: COMPLETED | OUTPATIENT
Start: 2020-01-01 | End: 2020-01-01

## 2020-01-01 RX ORDER — AMLODIPINE BESYLATE 5 MG/1
5 TABLET ORAL DAILY
Status: DISCONTINUED | OUTPATIENT
Start: 2020-01-01 | End: 2020-01-01

## 2020-01-01 RX ORDER — ACETAMINOPHEN 325 MG/1
650 TABLET ORAL EVERY 6 HOURS PRN
Status: DISCONTINUED | OUTPATIENT
Start: 2020-01-01 | End: 2020-01-01

## 2020-01-01 RX ORDER — ONDANSETRON 4 MG/1
4 TABLET, ORALLY DISINTEGRATING ORAL EVERY 6 HOURS PRN
Status: DISCONTINUED | OUTPATIENT
Start: 2020-01-01 | End: 2020-01-01

## 2020-01-01 RX ORDER — ONDANSETRON 2 MG/ML
4 INJECTION INTRAMUSCULAR; INTRAVENOUS ONCE
Status: COMPLETED | OUTPATIENT
Start: 2020-01-01 | End: 2020-01-01

## 2020-01-01 RX ORDER — LORAZEPAM 2 MG/ML
2 INJECTION INTRAMUSCULAR EVERY 4 HOURS PRN
Status: DISCONTINUED | OUTPATIENT
Start: 2020-01-01 | End: 2020-01-01

## 2020-01-01 RX ORDER — LORAZEPAM 2 MG/ML
1 INJECTION INTRAMUSCULAR EVERY 4 HOURS PRN
Status: DISCONTINUED | OUTPATIENT
Start: 2020-01-01 | End: 2020-01-01

## 2020-01-01 RX ORDER — HALOPERIDOL 5 MG/ML
2 INJECTION INTRAMUSCULAR
Status: DISCONTINUED | OUTPATIENT
Start: 2020-01-01 | End: 2020-01-01

## 2020-01-01 RX ORDER — BISACODYL 10 MG
10 SUPPOSITORY, RECTAL RECTAL
Status: DISCONTINUED | OUTPATIENT
Start: 2020-01-01 | End: 2020-01-01

## 2020-01-01 RX ORDER — SODIUM CHLORIDE 9 MG/ML
INJECTION, SOLUTION INTRAVENOUS CONTINUOUS
Status: DISCONTINUED | OUTPATIENT
Start: 2020-01-01 | End: 2020-01-01

## 2020-01-01 RX ORDER — ONDANSETRON 2 MG/ML
4 INJECTION INTRAMUSCULAR; INTRAVENOUS EVERY 6 HOURS PRN
Status: DISCONTINUED | OUTPATIENT
Start: 2020-01-01 | End: 2020-01-01

## 2020-01-01 RX ORDER — HYDROMORPHONE HYDROCHLORIDE 1 MG/ML
0.8 INJECTION, SOLUTION INTRAMUSCULAR; INTRAVENOUS; SUBCUTANEOUS EVERY 2 HOUR PRN
Status: DISCONTINUED | OUTPATIENT
Start: 2020-01-01 | End: 2020-01-01

## 2020-01-01 RX ORDER — TRANEXAMIC ACID 100 MG/ML
5 INJECTION, SOLUTION INTRAVENOUS ONCE
Status: COMPLETED | OUTPATIENT
Start: 2020-01-01 | End: 2020-01-01

## 2020-01-01 RX ORDER — LEVOTHYROXINE SODIUM 0.05 MG/1
50 TABLET ORAL DAILY
Status: DISCONTINUED | OUTPATIENT
Start: 2020-01-01 | End: 2020-01-01

## 2020-01-01 RX ORDER — LORAZEPAM 2 MG/ML
0.5 INJECTION INTRAMUSCULAR EVERY 4 HOURS PRN
Status: DISCONTINUED | OUTPATIENT
Start: 2020-01-01 | End: 2020-01-01

## 2020-01-01 RX ORDER — HALOPERIDOL 5 MG/ML
1 INJECTION INTRAMUSCULAR
Status: DISCONTINUED | OUTPATIENT
Start: 2020-01-01 | End: 2020-01-01

## 2020-01-01 RX ORDER — GLYCOPYRROLATE 0.2 MG/ML
0.2 INJECTION, SOLUTION INTRAMUSCULAR; INTRAVENOUS
Status: DISCONTINUED | OUTPATIENT
Start: 2020-01-01 | End: 2020-01-01

## 2020-01-01 RX ORDER — MORPHINE SULFATE 4 MG/ML
4 INJECTION, SOLUTION INTRAMUSCULAR; INTRAVENOUS EVERY 30 MIN PRN
Status: DISCONTINUED | OUTPATIENT
Start: 2020-01-01 | End: 2020-01-01

## 2020-01-01 RX ORDER — HYDROMORPHONE HYDROCHLORIDE 1 MG/ML
0.2 INJECTION, SOLUTION INTRAMUSCULAR; INTRAVENOUS; SUBCUTANEOUS EVERY 2 HOUR PRN
Status: DISCONTINUED | OUTPATIENT
Start: 2020-01-01 | End: 2020-01-01

## 2020-01-01 RX ORDER — MORPHINE SULFATE 2 MG/ML
1 INJECTION, SOLUTION INTRAMUSCULAR; INTRAVENOUS
Status: DISCONTINUED | OUTPATIENT
Start: 2020-01-01 | End: 2020-01-01

## 2020-01-01 RX ORDER — MORPHINE SULFATE 4 MG/ML
4 INJECTION, SOLUTION INTRAMUSCULAR; INTRAVENOUS EVERY 30 MIN PRN
Status: ACTIVE | OUTPATIENT
Start: 2020-01-01 | End: 2020-01-01

## 2020-02-11 PROBLEM — I73.9 PVD (PERIPHERAL VASCULAR DISEASE) (HCC): Status: ACTIVE | Noted: 2020-01-01

## 2020-02-11 PROBLEM — D68.51 FACTOR V LEIDEN (HCC): Status: ACTIVE | Noted: 2020-01-01

## 2020-02-11 PROBLEM — I26.99 OTHER PULMONARY EMBOLISM WITHOUT ACUTE COR PULMONALE (HCC): Status: ACTIVE | Noted: 2020-01-01

## 2020-05-22 NOTE — ED INITIAL ASSESSMENT (HPI)
Pt had her nose cauterized on Tuesday due to frequent nose bleeds. Stopped taking her warfarin two days ago. At 7pm pt had another nose bleed.

## 2020-05-22 NOTE — ED PROVIDER NOTES
Patient Seen in: [facility] Emergency Department      History   Patient presents with:  Nose Bleed    Stated Complaint:     HPI    20-year-old female presents with epistaxis.   Patient has been having frequent epistaxis and reports she was seen by an show no pallor. Oropharynx clear, mucous membranes moist   Neck: supple, no rigidity   Lungs: good air exchange and clear   Heart: regular rate rhythm and no murmur   Abdomen: Soft and nontender. No abdominal masses.   No peritoneal signs   Extremities: n within normal limits. INR is at 2.1. I feel she is okay for discharge home at this time. She is instructed to follow-up with her ENT tomorrow. To return with return of bleeding or with any concerns.               Disposition and Plan     Clinical Impres

## 2020-12-04 PROBLEM — R79.89 AZOTEMIA: Status: ACTIVE | Noted: 2020-01-01

## 2020-12-04 PROBLEM — R22.1 NECK MASS: Status: ACTIVE | Noted: 2020-01-01

## 2020-12-04 PROBLEM — R73.9 HYPERGLYCEMIA: Status: ACTIVE | Noted: 2020-01-01

## 2020-12-04 NOTE — ED INITIAL ASSESSMENT (HPI)
Rt neck mass that has gotten increasingly swollen over the past two weeks  ~ hx of thyroid cancer w/ left side removed     Negative COVID test on Wednesday ~ pt from [employer] who has no cases at this time

## 2020-12-04 NOTE — ED NOTES
Pt transferred to room P1 to be placed on cardiac monitoring and oxygen saturation monitoring. Result from [facility] shows an INR of 7.06 12/03/20.  MD is aware

## 2020-12-05 PROBLEM — D68.9 COAGULOPATHY (HCC): Status: ACTIVE | Noted: 2020-01-01

## 2020-12-05 NOTE — CM/SW NOTE
Asked to see patient by ER MD.     Patient is a 79 yo female with a rapidly enlarging R neck mass. This is thought to be tumor originating from patient's thyroid. Patient does not want any treatment to excise of otherwise treat.  She would like to be kept c

## 2020-12-05 NOTE — H&P
[facility]  History and Physical     Moo Mahoney Patient Status:  Emergency    1927 MRN [id number]   Location [address] [facility] Attending Daniela Moreira MD   Hosp Day # 0 PCP Doy Fee, DO     Chief Complai mg by mouth daily. Last dose coumadin 4/7/2019 in prep for thyroid noduel biopsy scheduled 4/12/2019 at 2 pm , Disp: , Rfl:     •  Montelukast Sodium 10 MG Oral Tab, Take 10 mg by mouth daily. , Disp: , Rfl: 3    •  gemfibrozil 600 MG Oral Tab, Take 600 mg 136   K 4.3      CO2 25.0   ALKPHO 116   AST 19   ALT 16   BILT 0.7   TP 8.2       Estimated Creatinine Clearance: 21.5 mL/min (A) (based on SCr of 1.59 mg/dL (H)).     Recent Labs   Lab 12/04/20  1454   PTP 44.6*   INR 4.62*       No results for inpu

## 2020-12-05 NOTE — PLAN OF CARE
Assumed care of patient at [address] [place] [address]. Patient A&Ox4. On 2L O2. . No telemetry. Briefed, utilizing bedpan. C/O pain to R neck/mass. 10/10 pain. No relief with Tylenol & minimal relief with morphine. Adjusting per primary.   [facility] consulted, no on type and severity of pain and evaluate response  - Implement non-pharmacological measures as appropriate and evaluate response  - Consider cultural and social influences on pain and pain management  - Manage/alleviate anxiety  - Utilize distraction and/

## 2020-12-05 NOTE — HOSPICE RN NOTE
Patient evaluated for Hospice admission. At this time the patient does not appear appropriate for GIP admission. Patient has a very large mass on the right side of her neck. Which is reported to have grown significantly recently.  However, the patient is A&

## 2020-12-05 NOTE — DIETARY MALNUTRITION NOTE
[facility]    NUTRITION INITIAL ASSESSMENT    Pt meets severe malnutrition criteria.     CRITERIA FOR MALNUTRITION DIAGNOSIS:  Criteria for severe malnutrition diagnosis: chronic illness related to wt loss greater than 7.5% in 3 months and energy intak Poor  Intake: <50%  Intake Meeting Needs: No, but supplements to maximize  Food Allergies: No  Cultural/Ethnic/Baptism Preferences Addresses: Yes    NUTRITION RELATED PHYSICAL FINDINGS:     1. Body Fat/Muscle Mass: not done d/t isolation at this time

## 2020-12-05 NOTE — CM/SW NOTE
Apparently hospice is stating pt is not appropriate for her assisted living, but not appropriate for GIP hospice. Not sure if they had a discussion w/the family in regards to this. Asked Rn to have [facility] contact [facility].

## 2020-12-05 NOTE — ED PROVIDER NOTES
Patient Seen in: [facility] Emergency Department      History   Patient presents with:  Lump Mass    Stated Complaint: Rt neck mass ~ hx of thyroid cancer w/ left side removed     HPI    Patient presents with a neck mass.   The patient was known to h 132/70   Pulse 94   Temp 99.9 °F (37.7 °C)   Resp 23   Ht 170.2 cm (5' 7\")   Wt 77.1 kg   SpO2 97%   BMI 26.63 kg/m²         Physical Exam    General: Alert and oriented x3 in no acute respiratory distress, slightly hoarse voice.   HEENT: Normocephalic, at CBC W/ DIFFERENTIAL[[id number]]          Abnormal            Final result                 Please view results for these tests on the individual orders.    RAINBOW DRAW BLUE   RAINBOW DRAW LAVENDER   RAINBOW DRAW LIGHT GREEN   RAINBOW DRAW GOLD right thyroid gland on the previous study there was a right thyroid mass. This could represent a large thyroid mass. Ultimately biopsy will be necessary for specific diagnosis.   LYMPH NODES:  At the cranial aspect of the mass there is a mixed attenuation (OMNIPAQUE) 350 MG/ML injection 100 mL (50 mL Intravenous Given 12/4/20 1619)   ondansetron HCl (ZOFRAN) injection 4 mg (4 mg Intravenous Given 12/4/20 1950)     The patient was started on IV fluids as she has acute renal insufficiency and a history of poo

## 2020-12-05 NOTE — PLAN OF CARE
NURSING ADMISSION NOTE      Patient admitted via Cart  Oriented to room. Safety precautions initiated. Bed in low position. Call light in reach. Assumed care of patient approximately midnight.  Admission navigator completed with patient at the bed appropriate  Outcome: Progressing

## 2020-12-05 NOTE — PROGRESS NOTES
[facility]  Progress Note     Chelseamumtaz Mendoza Patient Status:  Observation    1927 MRN [id number]   [facility] 4NW-A Attending Tomás Zarco MD   Hosp Day # 0 PCP Charles Henriquez DO     Chief Complaint: R neck mass    S: amLODIPine Besylate  5 mg Oral Daily   • atorvastatin  20 mg Oral Daily   • Vitamin D3  2,000 Units Oral Daily   • Metoprolol Succinate ER  50 mg Oral Daily Beta Blocker   • Pantoprazole Sodium  20 [facility]   • Levothyroxine Sodium  50 mcg Oral Daily

## 2020-12-05 NOTE — DISCHARGE SUMMARY
[facility]PITAL[facility]  DISCHARGE SUMMARY     Judy Letters Patient Status:  Observation    1927 MRN [id number]   [facility] 4NW-A Attending Toy Cervantes MD   Hosp Day # 0 JOSE Paredes,      Date of Admission: 2020  Ifeanyi 0     Vitamin D3 50 MCG (2000 UT) Tabs      Take 2,000 mg by mouth daily.    Refills: 0        STOP taking these medications    atorvastatin 20 MG Tabs  Commonly known as: LIPITOR        Calcium Ascorbate 500 MG Tabs        gemfibrozil 600 MG Tabs  Commonly

## 2020-12-06 NOTE — PHYSICAL THERAPY NOTE
Orders received and hx and chart reviewed. Pt has a referral for possible hospice placement. Asked to hold PT per RN, Lulu Bowman. Will continue to follow. Thank you.

## 2020-12-06 NOTE — PLAN OF CARE
Patient is a aox4 and [place], Pt is on 5l of O2 and pt is tolerating that well pt is having increased anxiety, sat pt up in bed told pt to deep breath and relax, and it helped some.  Pt still complain of sob very anxious not sleeping much md paged for anxiety pre-medicate as appropriate  Outcome: Progressing     Problem: Patient/Family Goals  Goal: Patient/Family Long Term Goal  Description: Patient's Long Term Goal: To be comfortable.     Interventions:  - [facility]idential hosp[facility] consult  -pain management per prima

## 2020-12-06 NOTE — CM/SW NOTE
Nirali Montiel from [facility]. confirmed he would re eval the pt today to see if she is appropriate for GIP.

## 2020-12-06 NOTE — PROGRESS NOTES
[facility]  Progress Note     Slick Console Patient Status:  Observation    1927 MRN [id number]   [facility] 4NW-A Attending Marc Green MD   Hosp Day # 0 PCP Carlitos Banks DO     Chief Complaint: R neck mass    S: TROP, CK in the last 168 hours. Imaging: Imaging data reviewed in Epic.     Medications:   • amLODIPine Besylate  5 mg Oral Daily   • atorvastatin  20 mg Oral Daily   • Vitamin D3  2,000 Units Oral Daily   • Metoprolol Succinate ER  50 mg Oral Daily

## 2020-12-06 NOTE — HOSPICE RN NOTE
Patient admitted to [facility] inpatient. GIP day 1. Patient admitted with a diagnosis of Right sided neck mass. Patient has a softball sized mass on the right side of her neck. Patient has a history of Thyroid cancer.  The mass has nearly doubl

## 2020-12-06 NOTE — PLAN OF CARE
Assumed care of patient at [address] [place] [address]. Patient A&Ox4. On 3L O2. . No telemetry. Briefed, utilizing bedpan. C/O pain to R neck/mass. 10/10 pain. Requiring IV Dilaudid with minimal relief noted. [facility] consulted, await re-eval today.   Daughter evaluate response  - Consider cultural and social influences on pain and pain management  - Manage/alleviate anxiety  - Utilize distraction and/or relaxation techniques  - Monitor for opioid side effects  - Notify MD/LIP if interventions unsuccessful or pa

## 2020-12-07 NOTE — HOSPICE RN NOTE
Afternoon rounds. Pt asleep in bed. Daughter states pt fell asleep about 1/2 hour ago. Morphine continues at 2mg/hr. Per daughter pt is less anxious. Pt not eating due to no appetite.

## 2020-12-07 NOTE — SPIRITUAL CARE NOTE
CHP present today to provide emotional/spiritual support to and dtr at bedside. Pt appears a/ox3, engaged and able to verbalize acceptance and peace with her EOL process. Dtr as well.  CHP supports pt/dtr with supportive presence, life review, prayer and re

## 2020-12-07 NOTE — PROGRESS NOTES
[facility]  Progress Note     Nevin Lacey Patient Status:  Observation    1927 MRN [id number]   [facility] 4NW-A Attending Fanta Sepulveda MD   Hosp Day # 1 PCP Zuly Brito DO     Chief Complaint: R neck mass    S: • scopolamine  1 patch Transdermal Q72H       ASSESSMENT / PLAN:     1. Neck mass w/ enlargement over past 2 weeks  1. CT scan noted   2. Supportive care   3. On morphine gtt  2. Coagulopathy  1. Pt AC on coumadin, INR supratherapeutic   2.  Hold coumadin

## 2020-12-07 NOTE — H&P
[facility]  History and Physical     Claudine De La Fuente Patient Status:  Inpatient    1927 MRN [id number]   [facility] 4NW-A Attending Sumeet Chairez MD   Hosp Day # 1 PCP Niraj Villalba DO     Chief Complaint: neck mass in the HPI. Physical Exam:    /74   Pulse 105   Temp 98.2 °F (36.8 °C) (Oral)   Resp 18   SpO2 92%   General: No acute distress. Alert and oriented x 3. HEENT: large R sided neck mass  Neck: No lymphadenopathy. No JVD. No carotid bruits.   Wong Choe Certification    Patient will require inpatient services that will reasonably be expected to span two midnight's based on the clinical documentation in H+P.    Based on patients current state of illness, I anticipate that, after discharge, patient will requ

## 2020-12-07 NOTE — PLAN OF CARE
Was experiencing a lot of discomfort to right side of the neck, between 8-9 on scale, patient is anxious, took sips of water and started chocking, assisted on semi grewal's position, no cyanosis noted, daughter at bedside.  Medicated with Ativan and increas OT/PT consult to assist with strengthening/mobility  - Encourage toileting schedule  Outcome: Progressing

## 2020-12-07 NOTE — HOSPICE RN NOTE
GIP day 2. Pt states she had a \"rough\" nigiht. Daughter Xuan Peters is bedside and stated pt was experiencing anxiety last night and medication given (Lorazapem) helped. Pt appears to be anxious at time of hospice nurse visit.   Had received Lorazapam a coup

## 2020-12-08 NOTE — PROGRESS NOTES
[facility]  Progress Note     Stan Wallace Patient Status:  Observation    1927 MRN [id number]   [facility] 4NW-A Attending Kristeen Hashimoto, MD   Hosp Day # 2 PCP Moy Leblanc DO     Chief Complaint: R neck mass    S: Q72H       ASSESSMENT / PLAN:     1. Neck mass w/ enlargement over past 2 weeks  1. CT scan noted   2. Supportive care   3. On morphine gtt  2. Coagulopathy  1. Pt AC on coumadin, INR supratherapeutic   2.  Hold coumadin, no evidence of bleed no need to rev

## 2020-12-08 NOTE — HOSPICE RN NOTE
GIP D3- patient received in bed less responsive. Morphine infusing at 2 mg/hour. Upon auscultating lungs patient winced in pain. Daughter relates tumor load in chest is the reason for pain. Daughter also relates pain with turning and bathing.   Pulse 102-

## 2020-12-09 NOTE — PROGRESS NOTES
[facility]  Progress Note     Ronita Lab Patient Status:  Observation    1927 MRN [id number]   [facility] 4NW-A Attending Medardo Kim MD   Hosp Day # 3 PCP Marixa Melgar DO     Chief Complaint: R neck mass    S: Q72H       ASSESSMENT / PLAN:     1. Neck mass w/ enlargement over past 2 weeks  1. CT scan noted   2. Supportive care   3. On morphine gtt  2. Coagulopathy  1. Pt AC on coumadin, INR supratherapeutic   2.  Hold coumadin, no evidence of bleed no need to rev

## 2020-12-09 NOTE — SPIRITUAL CARE NOTE
Supportive visit to pt and dtr at bedside. Pt is minimally responsive. Pt shows some facial grimacing. Report made to N and [facility] RN Dtr appears to be coping ok.

## 2020-12-09 NOTE — CM/SW NOTE
SW met with pt and daughter at bedside. Pt minimally responsive. Provided support to daughter and educate on eol signs and symptoms. Arturo Gotti

## 2020-12-09 NOTE — HOSPICE RN NOTE
GIP day 4. Pt asleep but easily arousable. Morphine was increased to 3mg/hour. Pt states no pain. Daughter Mayra Pickard at bedside. Floor RN Shukri Barnard unavailable at time of visit. Continue GIP for pain management and management of anxiety.

## 2020-12-09 NOTE — PLAN OF CARE
Assumed care of pt @ 0730. Pt A/Ox2 but got more drowsy throughout the day. All needs addressed. Daughter updated on POC. Morphine gtt increased to 3mg/hr, 1 bolus given as pt was grimacing. O2 at 2L for comfort. Will continue to monitor.        Problem: PA

## 2020-12-09 NOTE — PLAN OF CARE
Pt. Amber Males but is arousable. VSS. Pt. On 5L O2 for comfort. Pt. C/o pain to her neck; bolus from PCA given per [facility]. Pt. Resting comfortably at this time. PCA infusing at 2mg/hr. Daughter and son at the bedside. Call light within reach.  Will continue to mon

## 2020-12-09 NOTE — PLAN OF CARE
Patient received alert and confused, all needs anticipated by staff, repositioned throughout the night. Breathing is regular, unlabored, oxygen left on for patient comfort.      Problem: PAIN - ADULT  Goal: Verbalizes/displays adequate comfort level or pa

## 2020-12-09 NOTE — HOSPICE RN NOTE
Afternoon rounds. Pt is asleep. Appears comfortable. Son Sonja Cardenas at bedside. Morphine continues at 3mg/hour. Received 1mg IVP at 12:45p.

## 2020-12-10 NOTE — DISCHARGE SUMMARY
[facility]OSPITAL[facility]  DISCHARGE SUMMARY     Alexy Guerra Patient Status:  Inpatient    1927 MRN [id number]   [facility] 4NW-A Attending Kesha Manuel MD   [address] Day # 4 PCP Laurel Cueto DO     Date of Admission: 2020  Date of

## 2020-12-10 NOTE — PLAN OF CARE
Pt is aox1 on 2L of oxygen for comfort . Pt is resting bed pain seem to  be ok. No complaints of pain at this time. Pt has a pca pump getting 3gtt per hour. Will continue to monitor pt during this shift.    Problem: PAIN - ADULT  Goal: Verbalizes/displays a

## 2020-12-10 NOTE — PLAN OF CARE
Pt. Delio Montagueys; resting comfortably at this time. Does not appear in any pain or distress. Morphine gtt infusing at 3mg/hr. O2 at 2L for comfort. Daughter at the bedside. Will continue to monitor. 1220: Pt.  at 1220. Daughter at the bedside.  MD saucedo

## 2020-12-10 NOTE — HOSPICE RN NOTE
GIP day 5. Pt asleep. Does not respond to name. Body is warm to touch. IV patent and intact. Daughter Metta Felling present. Singing Ferris carols to pt. Daughter noticed that swelling is starting to left neck. Pt not eating or drinking for 5 days now.

## (undated) NOTE — LETTER
Patient Name: Loretta Adams  YOB: 1927          MRN number:  [id number]  Date:  12/4/2018  Referring Physician:  No ref.  provider found     Dear Dr. Ana Pringle,    This letter is to inform you of initial findings from [facility] clinical sw Comments: Patient reported history of thyroid cancer, GERD. Current Outpatient Medications:  Metoprolol Succinate ER 50 MG Oral Tablet 24 Hr Take 20 mg by mouth daily. Disp:  Rfl:    UNITHROID 50 MCG Oral Tab Take 0.05 mg by mouth daily.  Disp:  Rfl: 2 solids) upon visual check of oral cavity.  Patient displayed impairment for pharyngeal phase characterized by suspected delayed trigger of swallow; consistent complaints puree and solids feeling stuck in throat; and consistent throat-clearing during solid Pharyngeal Phase of Swallow impaired   Swallowing timing decresaed. , Laryngeal elevation and excursion decreased and Signs/symptoms of aspiration : consistent feeling of solids feeling stuck in throat.   (Please note: Silent aspiration cannot be evaluated c Patient/Family/Caregiver was advised of these findings, precautions, and treatment options and has agreed to actively participate in planning and for this course of care.     Thank you for your referral. Please co-sign or sign and return this letter via fax

## (undated) NOTE — ED AVS SNAPSHOT
Gilford Call   MRN: [id number]    Department:  [facility] Emergency Department   Date of Visit:  10/15/2018           Disclosure     Insurance plans vary and the physician(s) referred by the ER may not be covered by your plan.  Please contact yo tell this physician (or your personal doctor if your instructions are to return to your personal doctor) about any new or lasting problems. The primary care or specialist physician will see patients referred from the [facility] Emergency Department.  Musa Resendiz